# Patient Record
Sex: FEMALE | HISPANIC OR LATINO | URBAN - METROPOLITAN AREA
[De-identification: names, ages, dates, MRNs, and addresses within clinical notes are randomized per-mention and may not be internally consistent; named-entity substitution may affect disease eponyms.]

---

## 2019-01-15 ENCOUNTER — OFFICE VISIT (OUTPATIENT)
Dept: URBAN - METROPOLITAN AREA CLINIC 76 | Facility: CLINIC | Age: 44
End: 2019-01-15
Payer: COMMERCIAL

## 2019-01-15 DIAGNOSIS — H04.123 DRY EYE SYNDROME OF BILATERAL LACRIMAL GLANDS: ICD-10-CM

## 2019-01-15 DIAGNOSIS — H52.13 MYOPIA, BILATERAL: Primary | ICD-10-CM

## 2019-01-15 PROCEDURE — 92310 CONTACT LENS FITTING OU: CPT | Performed by: OPTOMETRIST

## 2019-01-15 PROCEDURE — 92014 COMPRE OPH EXAM EST PT 1/>: CPT | Performed by: OPTOMETRIST

## 2019-01-15 ASSESSMENT — VISUAL ACUITY
OD: 20/20
OS: 20/20

## 2019-01-15 ASSESSMENT — KERATOMETRY
OD: 44.25
OS: 44.38

## 2019-01-15 ASSESSMENT — INTRAOCULAR PRESSURE
OD: 16
OS: 14

## 2019-01-15 NOTE — IMPRESSION/PLAN
Impression: Diagnosis: Dry eye syndrome of bilateral lacrimal glands. Code: I42.644. incomplete blink OS>OD. Plan:  NONA. Discussed dry eye disease. recommend artificial tears up to QID, omega-3. can consider punctal plugs.

## 2019-01-15 NOTE — IMPRESSION/PLAN
Impression: Myopia, bilateral: H52.13. OU. Plan: A glasses prescription has been discussed and generated. A contact lens prescription has also been discussed and generated. Patient to call with any concerns.

## 2022-06-14 ENCOUNTER — OFFICE VISIT (OUTPATIENT)
Dept: URBAN - METROPOLITAN AREA CLINIC 76 | Facility: CLINIC | Age: 47
End: 2022-06-14
Payer: COMMERCIAL

## 2022-06-14 DIAGNOSIS — H04.123 DRY EYE SYNDROME OF BILATERAL LACRIMAL GLANDS: ICD-10-CM

## 2022-06-14 DIAGNOSIS — H52.13 MYOPIA, BILATERAL: Primary | ICD-10-CM

## 2022-06-14 PROCEDURE — 92004 COMPRE OPH EXAM NEW PT 1/>: CPT | Performed by: OPTOMETRIST

## 2022-06-14 ASSESSMENT — VISUAL ACUITY
OS: 20/20
OD: 20/20

## 2022-06-14 ASSESSMENT — INTRAOCULAR PRESSURE
OD: 15
OS: 15

## 2022-06-14 ASSESSMENT — KERATOMETRY
OS: 44.38
OD: 44.25

## 2022-06-14 NOTE — IMPRESSION/PLAN
Impression: Diagnosis: Dry eye syndrome of bilateral lacrimal glands. Code: J25.227. incomplete blink OS>OD. Pt inquired about punctal plugs. Plan: NONA. Discussed dry eye disease. recommend artificial tears up to QID, omega-3. Gave option of Collagen vs perm plugs. pt Would like to try collagen plugs, schedule appt for plugs PRN.